# Patient Record
(demographics unavailable — no encounter records)

---
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
## 2024-10-21 NOTE — REASON FOR VISIT
[Consultation for neuropsychological evaluation] : Consultation for neuropsychological evaluation [Patient] : Patient [Supervisor present for visit (for trainees)] : Supervisor present for visit (for trainees). [Interpreters_IDNumber] : [id number] [TWNoteComboBox1] : Turkish

## 2024-10-21 NOTE — REASON FOR VISIT
[Consultation for neuropsychological evaluation] : Consultation for neuropsychological evaluation [Patient] : Patient [Supervisor present for visit (for trainees)] : Supervisor present for visit (for trainees). [Interpreters_IDNumber] : [id number] [TWNoteComboBox1] : [place]